# Patient Record
Sex: MALE | Race: WHITE | NOT HISPANIC OR LATINO | Employment: FULL TIME | URBAN - METROPOLITAN AREA
[De-identification: names, ages, dates, MRNs, and addresses within clinical notes are randomized per-mention and may not be internally consistent; named-entity substitution may affect disease eponyms.]

---

## 2020-09-28 ENCOUNTER — HISTORICAL (OUTPATIENT)
Dept: ADMINISTRATIVE | Facility: HOSPITAL | Age: 35
End: 2020-09-28

## 2020-09-28 LAB
ABS NEUT (OLG): 4.6 X10(3)/MCL (ref 2.1–9.2)
ALBUMIN SERPL-MCNC: 4.4 GM/DL (ref 3.4–5)
ALBUMIN/GLOB SERPL: 1.83 {RATIO} (ref 1.5–2.5)
ALP SERPL-CCNC: 65 UNIT/L (ref 38–126)
ALT SERPL-CCNC: 23 UNIT/L (ref 7–52)
APPEARANCE, UA: CLEAR
AST SERPL-CCNC: 17 UNIT/L (ref 15–37)
BACTERIA #/AREA URNS AUTO: NORMAL /HPF
BILIRUB SERPL-MCNC: 0.6 MG/DL (ref 0.2–1)
BILIRUB UR QL STRIP: NEGATIVE MG/DL
BILIRUBIN DIRECT+TOT PNL SERPL-MCNC: 0.2 MG/DL (ref 0–0.5)
BILIRUBIN DIRECT+TOT PNL SERPL-MCNC: 0.4 MG/DL
BUN SERPL-MCNC: 6 MG/DL (ref 7–18)
CALCIUM SERPL-MCNC: 9.2 MG/DL (ref 8.5–10.1)
CHLORIDE SERPL-SCNC: 101 MMOL/L (ref 98–107)
CHOLEST SERPL-MCNC: 177 MG/DL (ref 0–200)
CHOLEST/HDLC SERPL: 3.2 {RATIO}
CO2 SERPL-SCNC: 27 MMOL/L (ref 21–32)
COLOR UR: NORMAL
CREAT SERPL-MCNC: 0.83 MG/DL (ref 0.6–1.3)
ERYTHROCYTE [DISTWIDTH] IN BLOOD BY AUTOMATED COUNT: 13.8 % (ref 11.5–17)
GLOBULIN SER-MCNC: 2.4 GM/DL (ref 1.2–3)
GLUCOSE (UA): NEGATIVE MG/DL
GLUCOSE SERPL-MCNC: 87 MG/DL (ref 74–106)
HCT VFR BLD AUTO: 47.4 % (ref 42–52)
HDLC SERPL-MCNC: 56 MG/DL (ref 35–60)
HGB BLD-MCNC: 15.4 GM/DL (ref 14–18)
HGB UR QL STRIP: NEGATIVE UNIT/L
KETONES UR QL STRIP: NEGATIVE MG/DL
LDLC SERPL CALC-MCNC: 118 MG/DL (ref 0–129)
LEUKOCYTE ESTERASE UR QL STRIP: NEGATIVE UNIT/L
LYMPHOCYTES # BLD AUTO: 2.1 X10(3)/MCL (ref 0.6–3.4)
LYMPHOCYTES NFR BLD AUTO: 27.9 % (ref 13–40)
MCH RBC QN AUTO: 27.8 PG (ref 27–31.2)
MCHC RBC AUTO-ENTMCNC: 32 GM/DL (ref 32–36)
MCV RBC AUTO: 86 FL (ref 80–94)
MONOCYTES # BLD AUTO: 0.7 X10(3)/MCL (ref 0.1–1.3)
MONOCYTES NFR BLD AUTO: 10 % (ref 0.1–24)
NEUTROPHILS NFR BLD AUTO: 62.1 % (ref 47–80)
NITRITE UR QL STRIP.AUTO: NEGATIVE
PH UR STRIP: 8 [PH]
PLATELET # BLD AUTO: 273 X10(3)/MCL (ref 130–400)
PMV BLD AUTO: 9 FL (ref 9.4–12.4)
POTASSIUM SERPL-SCNC: 4.5 MMOL/L (ref 3.5–5.1)
PROT SERPL-MCNC: 6.8 GM/DL (ref 6.4–8.2)
PROT UR QL STRIP: NEGATIVE MG/DL
RBC # BLD AUTO: 5.54 X10(6)/MCL (ref 4.7–6.1)
RBC #/AREA URNS HPF: NORMAL /HPF
SODIUM SERPL-SCNC: 141 MMOL/L (ref 136–145)
SP GR UR STRIP: 1.01
SQUAMOUS EPITHELIAL, UA: NORMAL /LPF
TRIGL SERPL-MCNC: 83 MG/DL (ref 30–150)
TSH SERPL-ACNC: 0.89 MIU/ML (ref 0.35–4.94)
UROBILINOGEN UR STRIP-ACNC: 0.2 MG/DL
VLDLC SERPL CALC-MCNC: 16.6 MG/DL
WBC # SPEC AUTO: 7.4 X10(3)/MCL (ref 4.5–11.5)
WBC #/AREA URNS AUTO: NORMAL /[HPF]

## 2021-09-29 ENCOUNTER — HISTORICAL (OUTPATIENT)
Dept: ADMINISTRATIVE | Facility: HOSPITAL | Age: 36
End: 2021-09-29

## 2021-09-29 LAB
ABS NEUT (OLG): 4.6 X10(3)/MCL (ref 2.1–9.2)
ALBUMIN SERPL-MCNC: 4.6 GM/DL (ref 3.4–5)
ALBUMIN/GLOB SERPL: 2 {RATIO} (ref 1.5–2.5)
ALP SERPL-CCNC: 55 UNIT/L (ref 38–126)
ALT SERPL-CCNC: 33 UNIT/L (ref 7–52)
APPEARANCE, UA: CLEAR
AST SERPL-CCNC: 23 UNIT/L (ref 15–37)
BACTERIA #/AREA URNS AUTO: ABNORMAL /HPF
BILIRUB SERPL-MCNC: 0.8 MG/DL (ref 0.2–1)
BILIRUB UR QL STRIP: NEGATIVE MG/DL
BILIRUBIN DIRECT+TOT PNL SERPL-MCNC: 0.2 MG/DL (ref 0–0.5)
BILIRUBIN DIRECT+TOT PNL SERPL-MCNC: 0.6 MG/DL
BUN SERPL-MCNC: 10 MG/DL (ref 7–18)
CALCIUM SERPL-MCNC: 9.2 MG/DL (ref 8.5–10.1)
CHLORIDE SERPL-SCNC: 102 MMOL/L (ref 98–107)
CHOLEST SERPL-MCNC: 206 MG/DL (ref 0–200)
CHOLEST/HDLC SERPL: 3.6 {RATIO}
CO2 SERPL-SCNC: 26 MMOL/L (ref 21–32)
COLOR UR: YELLOW
CREAT SERPL-MCNC: 0.88 MG/DL (ref 0.6–1.3)
ERYTHROCYTE [DISTWIDTH] IN BLOOD BY AUTOMATED COUNT: 12.8 % (ref 11.5–17)
GLOBULIN SER-MCNC: 2.3 GM/DL (ref 1.2–3)
GLUCOSE (UA): NEGATIVE MG/DL
GLUCOSE SERPL-MCNC: 79 MG/DL (ref 74–106)
HCT VFR BLD AUTO: 46.9 % (ref 42–52)
HDLC SERPL-MCNC: 58 MG/DL (ref 35–60)
HGB BLD-MCNC: 15.3 GM/DL (ref 14–18)
HGB UR QL STRIP: NEGATIVE UNIT/L
KETONES UR QL STRIP: ABNORMAL MG/DL
LDLC SERPL CALC-MCNC: 114 MG/DL (ref 0–129)
LEUKOCYTE ESTERASE UR QL STRIP: NEGATIVE UNIT/L
LYMPHOCYTES # BLD AUTO: 2.8 X10(3)/MCL (ref 0.6–3.4)
LYMPHOCYTES NFR BLD AUTO: 34.4 % (ref 13–40)
MCH RBC QN AUTO: 27.7 PG (ref 27–31.2)
MCHC RBC AUTO-ENTMCNC: 33 GM/DL (ref 32–36)
MCV RBC AUTO: 85 FL (ref 80–94)
MONOCYTES # BLD AUTO: 0.7 X10(3)/MCL (ref 0.1–1.3)
MONOCYTES NFR BLD AUTO: 8.7 % (ref 0.1–24)
NEUTROPHILS NFR BLD AUTO: 56.9 % (ref 47–80)
NITRITE UR QL STRIP.AUTO: NEGATIVE
PH UR STRIP: 6 [PH]
PLATELET # BLD AUTO: 273 X10(3)/MCL (ref 130–400)
PMV BLD AUTO: 8.4 FL (ref 9.4–12.4)
POTASSIUM SERPL-SCNC: 4.3 MMOL/L (ref 3.5–5.1)
PROT SERPL-MCNC: 6.9 GM/DL (ref 6.4–8.2)
PROT UR QL STRIP: NEGATIVE MG/DL
RBC # BLD AUTO: 5.52 X10(6)/MCL (ref 4.7–6.1)
RBC #/AREA URNS HPF: ABNORMAL /HPF
SODIUM SERPL-SCNC: 139 MMOL/L (ref 136–145)
SP GR UR STRIP: 1.01
SQUAMOUS EPITHELIAL, UA: ABNORMAL /LPF
TRIGL SERPL-MCNC: 60 MG/DL (ref 30–150)
TSH SERPL-ACNC: 0.59 MIU/ML (ref 0.35–4.94)
UROBILINOGEN UR STRIP-ACNC: 0.2 MG/DL
VLDLC SERPL CALC-MCNC: 12 MG/DL
WBC # SPEC AUTO: 8.1 X10(3)/MCL (ref 4.5–11.5)
WBC #/AREA URNS AUTO: ABNORMAL /[HPF]

## 2021-09-30 LAB — EST CREAT CLEARANCE SER (OHS): 210.63 ML/MIN

## 2022-04-10 ENCOUNTER — HISTORICAL (OUTPATIENT)
Dept: ADMINISTRATIVE | Facility: HOSPITAL | Age: 37
End: 2022-04-10

## 2022-04-26 VITALS
OXYGEN SATURATION: 96 % | HEIGHT: 71 IN | WEIGHT: 277.56 LBS | DIASTOLIC BLOOD PRESSURE: 78 MMHG | SYSTOLIC BLOOD PRESSURE: 120 MMHG | BODY MASS INDEX: 38.86 KG/M2

## 2022-05-03 NOTE — HISTORICAL [facility]
This is a historical note converted from Olena. Formatting and pictures may have been removed.  Please reference Olena for original formatting and attached multimedia. Chief Complaint  Annual wellness physical- NPO  History of Present Illness  Patient present today for Wellness CPX.  He is feeling well.  He is sleeping well.  He walks about 3x week.? Goes to gym frequently.?  He has alcohol 1-2 times a week.  He has 2 cups of coffee a day.  No tobacco.  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  He is single.? No children.  Taking Descovy for PrEP.  He has a longstanding history of?anal fissures?and internal hemorrhoids.? He has hard?and?infrequent stool.? He may have a bowel movement every 1 to 2 days.? His stool is hard?and brown?with blood?noted in stool x1 month.? He has blood when wiping.? He has rectal discomfort after bowel movements.? No rectal itching, diarrhea.  Review of Systems  GENERAL: No weight loss, no?weight gain, no fever, no fatigue, no chills, no night sweats  HEENT: No sore throat, no ear pain, no sinus pressure, no nasal congestion, no rhinorrhea, no decreased hearing, no snoring  VISION: No vision changes, no blurry vision, no double vision, no glaucoma, no cataracts, +glasses, +contacts  LAST EYE EXAM: June 2021  NECK: no LAD  CARDIAC: No chest pain, no palpitations, no dyspnea on exertion, no orthopnea  RESPIRATORY: No cough, no wheezing, no sputum production, no?SOB  GI: No abdominal pain, no N/V, no constipation, no diarrhea, +blood in stool,?(- )?family history of Colon Ca  : No dysuria, no hematuria, no frequency, no urgency, no incontinence, no testicular pain/swelling, ( -) family history of Prostate Ca  MUSC/SKEL: No myalgia, no weakness, no edema, no arthralgia, no joint swelling/effusions  SKIN: No rashes, no hives, no itching, no sores  NEURO: No HA, no numbness, no tingling, no weakness, no dizziness  PSYCH: No anxiety, no depression, no?irritability, no panic attacks,?no s/i, no  h/i, no?hallucinations  ENDO: No polyuria, no polyphagia,? no polydipsia  HEME: No bruising, no bleeding disorders, no signs of anemia.?  Physical Exam  Vitals & Measurements  T:?36.8? ?C (Oral)? HR:?72(Peripheral)? BP:?120/78? SpO2:?96%?  HT:?180.00?cm? WT:?125.900?kg? BMI:?38.86?  General: Well developed, well nourished in no apparent distress, alert and oriented x3  Skin:?No rash or abnormal?lesions  HEENT:?Normocephalic, PERRLA, EOMI, mouth WNL, throat WNL, nares normal, EAC and TM WNL bilaterally  Neck:?FROM, no LAD, no thyroid abnormalities?palpable  Chest:?CTA?bilaterally, no wheezes crackles or rubs  Cardiac: RRR,?no murmurs, rubs, gallops  Abdomen: Soft, nontender,?nondistended, NBSx4, no rebound tenderness or guarding, no HSM  Extremities:?No clubbing, cyanosis, or edema.? Joints WNL, +2 DP/PT pulses bilaterally  Neuro: No sensory or motor defects noted. CN II-XII intact. Gait WNL.  Genital: normal testes, no hernia  Rectum:?No external?hemorrhoids?noted. ?No fissures?noted.  Assessment/Plan  1.?Wellness examination?Z00.00  CBC, CMP, Lipids, UA, TSH?ordered today.?  He is taking Descovy for PrEP.  Encourage pt to increase cardiovascular exercise and attempt to obtain at least 150 minutes of moderate aerobic exercise per week or 75 minutes of vigorous aerobic exercise weekly.??  Ordered:  CBC w/ Auto Diff, Routine collect, 09/29/21 15:59:00 CDT, Blood, Order for future visit, Stop date 09/29/21 15:59:00 CDT, Lab Collect, Wellness examination  Depression  Anxiety  Internal hemorrhoids, 09/29/21 15:59:00 CDT  Clinic Follow-Up Wellness, *Est. 09/29/22 3:00:00 CDT, Order for future visit, Wellness examination, HLink [facility]  Comprehensive Metabolic Panel, Routine collect, 09/29/21 15:59:00 CDT, Blood, Order for future visit, Stop date 09/29/21 15:59:00 CDT, Lab Collect, Wellness examination  Depression  Anxiety  Internal hemorrhoids, 09/29/21 15:59:00 CDT  Lab Collection Request, 09/29/21 15:59:00 CDJUAN MILLER  AMB - [facility], 09/29/21 15:59:00 CDT, Wellness examination  Depression  Anxiety  Internal hemorrhoids  Lipid Panel, Routine collect, 09/29/21 15:59:00 CDT, Blood, Order for future visit, Stop date 09/29/21 15:59:00 CDT, Lab Collect, Wellness examination, 09/29/21 15:59:00 CDT  Preventative Health Care Est 18-39 years [id number] PC, Wellness examination  Depression  Anxiety  Immunization due  Internal hemorrhoids, HLINK AMB - [facility], 09/29/21 15:59:00 CDT  Thyroid Stimulating Hormone, Routine collect, 09/29/21 15:59:00 CDT, Blood, Order for future visit, Stop date 09/29/21 15:59:00 CDT, Lab Collect, Wellness examination  Depression  Anxiety, 09/29/21 15:59:00 CDT  Urinalysis no Reflex, Routine collect, Urine, Order for future visit, 09/29/21 15:59:00 CDT, Stop date 09/29/21 15:59:00 CDT, Nurse collect, Wellness examination  ?  2.?Depression?F32.9  Stable with Trintellix;?continue.? ED precautions per suicidal ideation,?homicidal ideation,?self-harm?and verbalized understanding.? If concerns or issues arise after office hours,?call 911 or report to emergency room; patient verbalized understanding.?  Ordered:  CBC w/ Auto Diff, Routine collect, 09/29/21 15:59:00 CDT, Blood, Order for future visit, Stop date 09/29/21 15:59:00 CDT, Lab Collect, Wellness examination  Depression  Anxiety  Internal hemorrhoids, 09/29/21 15:59:00 CDT  Comprehensive Metabolic Panel, Routine collect, 09/29/21 15:59:00 CDT, Blood, Order for future visit, Stop date 09/29/21 15:59:00 CDT, Lab Collect, Wellness examination  Depression  Anxiety  Internal hemorrhoids, 09/29/21 15:59:00 CDT  Lab Collection Request, 09/29/21 15:59:00 CDT, HLINK AMB - [facility], 09/29/21 15:59:00 CDT, Wellness examination  Depression  Anxiety  Internal hemorrhoids  Preventative Health Care Est 18-39 years [id number] PC, Wellness examination  Depression  Anxiety  Immunization due  Internal hemorrhoids, HLINK AMB - [facility], 09/29/21 15:59:00 CDT  Thyroid Stimulating Hormone,  Routine collect, 09/29/21 15:59:00 CDT, Blood, Order for future visit, Stop date 09/29/21 15:59:00 CDT, Lab Collect, Wellness examination  Depression  Anxiety, 09/29/21 15:59:00 CDT  ?  3.?Anxiety?F41.9  #2.  Ordered:  CBC w/ Auto Diff, Routine collect, 09/29/21 15:59:00 CDT, Blood, Order for future visit, Stop date 09/29/21 15:59:00 CDT, Lab Collect, Wellness examination  Depression  Anxiety  Internal hemorrhoids, 09/29/21 15:59:00 CDT  Comprehensive Metabolic Panel, Routine collect, 09/29/21 15:59:00 CDT, Blood, Order for future visit, Stop date 09/29/21 15:59:00 CDT, Lab Collect, Wellness examination  Depression  Anxiety  Internal hemorrhoids, 09/29/21 15:59:00 CDT  Lab Collection Request, 09/29/21 15:59:00 CDT, Teravac AMB - [facility], 09/29/21 15:59:00 CDT, Wellness examination  Depression  Anxiety  Internal hemorrhoids  Preventative Health Care Est 18-39 years [id number] PC, Wellness examination  Depression  Anxiety  Immunization due  Internal hemorrhoids, Teravac AMB - [facility], 09/29/21 15:59:00 CDT  Thyroid Stimulating Hormone, Routine collect, 09/29/21 15:59:00 CDT, Blood, Order for future visit, Stop date 09/29/21 15:59:00 CDT, Lab Collect, Wellness examination  Depression  Anxiety, 09/29/21 15:59:00 CDT  ?  4.?Immunization due?Z23  Flu?0.5ml admin today--risks/benefits discussed with patient.?  Tdap?0.5ml admin today--risks/benefits discussed with patient.?  Ordered:  Influenza Virus Vaccine, Inactivated, 0.5 mL, IM, Once-Unscheduled, first dose 09/29/21 15:59:00 CDT  tetanus/diphth/pertuss (Tdap) adult/adol, 0.5 mL, IM, Once-Unscheduled, first dose 09/29/21 15:59:00 CDT  Preventative Health Care Est 18-39 years [id number] PC, Wellness examination  Depression  Anxiety  Immunization due  Internal hemorrhoids, Loop AppINK AMB - [facility], 09/29/21 15:59:00 CDT  ?  5.?Internal hemorrhoids?K64.8  [facility]  Soften stool with MiraLAX or Colace stool softeners.  He is to call if persist  despite above therapies---will send to GI for further eval.  Ordered:  CBC w/ Auto Diff, Routine collect, 09/29/21 15:59:00 CDT, Blood, Order for future visit, Stop date 09/29/21 15:59:00 CDT, Lab Collect, Wellness examination  Depression  Anxiety  Internal hemorrhoids, 09/29/21 15:59:00 CDT  Comprehensive Metabolic Panel, Routine collect, 09/29/21 15:59:00 CDT, Blood, Order for future visit, Stop date 09/29/21 15:59:00 CDT, Lab Collect, Wellness examination  Depression  Anxiety  Internal hemorrhoids, 09/29/21 15:59:00 CDT  Lab Collection Request, 09/29/21 15:59:00 CDT, HLINK AMB - [facility], 09/29/21 15:59:00 CDT, Wellness examination  Depression  Anxiety  Internal hemorrhoids  Preventative Health Care Est 18-39 years [id number] PC, Wellness examination  Depression  Anxiety  Immunization due  Internal hemorrhoids, HLINK AMB - [facility], 09/29/21 15:59:00 CDT  ?  Orders:  vortioxetine, 20 mg = 1 tab(s), Oral, Daily, # 30 tab(s), 11 Refill(s), Pharmacy: [facility] [id number] [facility], 180, cm, Height/Length Dosing, 09/29/21 15:20:00 CDT, 125.9, kg, Weight Dosing, 09/29/21 15:20:00 CDT  Referrals  Clinic Follow-Up Wellness, *Est. 09/29/22 3:00:00 CDT, Order for future visit, Wellness examination, [facility]Link [facility]   Problem List/Past Medical History  Ongoing  Anal fissure  Anxiety  Depression  Internal hemorrhoids  Morbid obesity  Historical  No qualifying data  Procedure/Surgical History  Deviated nasal septum (04.2021)  [place] teeth extracted   Medications  Descovy 200 mg-25 mg oral tablet, 1 tab(s), Oral, Daily  Trintellix 20 mg oral tablet, 20 mg= 1 tab(s), Oral, Daily, 11 refills  Allergies  No Known Medication Allergies  Social History  Abuse/Neglect  No, 06/26/2020  Alcohol  Current, 1-2 times per week, 04/15/2019  Employment/School  Employed, Work/School description: - [employer]., 04/15/2019  Exercise  Home/Environment  Living situation: Home/Independent., 04/15/2019  Nutrition/Health  Regular, Caffeine  intake amount: 2 CUPS OF COFFEE PER DAY., 04/15/2019  Substance Use  Never, 04/15/2019  Tobacco  Never (less than 100 in lifetime), N/A, 06/26/2020  Family History  Diabetes mellitus: Mother.  Immunizations  Vaccine Date Status   tetanus/diphtheria/pertussis, acel(Tdap) 09/29/2021 Given   influenza virus vaccine, inactivated 09/29/2021 Given   COVID-19 MRNA, LNP-S, PF- Pfizer 03/24/2021 Given   COVID-19 MRNA, LNP-S, PF- Pfizer 03/03/2021 Given   influenza virus vaccine, inactivated 09/27/2020 Recorded   influenza virus vaccine, inactivated 11/20/2019 Given   Health Maintenance  Health Maintenance  ???Pending?(in the next year)  ??? ??OverDue  ??? ? ? ?Alcohol Misuse Screening due??01/02/21??and every 1??year(s)  ??? ??Due?  ??? ? ? ?ADL Screening due??09/29/21??and every 1??year(s)  ??? ??Due In Future?  ??? ? ? ?Obesity Screening not due until??01/01/22??and every 1??year(s)  ???Satisfied?(in the past 1 year)  ??? ??Satisfied?  ??? ? ? ?Blood Pressure Screening on??09/29/21.??Satisfied by Eden Rosa LPN  ??? ? ? ?Body Mass Index Check on??09/29/21.??Satisfied by Eden Rosa LPN  ??? ? ? ?Influenza Vaccine on??09/29/21.??Satisfied by Eden Rosa LPN  ??? ? ? ?Obesity Screening on??09/29/21.??Satisfied by Eden Rosa LPN  ??? ? ? ?Tetanus Vaccine on??09/29/21.??Satisfied by Eden Rosa LPN  ?      Patients condition discussed in detail with nurse practitioner.?  I have reviewed and agree with plan of care and follow-up.

## 2022-05-03 NOTE — HISTORICAL [facility]
This is a historical note converted from Olena. Formatting and pictures may have been removed.  Please reference Olena for original formatting and attached multimedia. Chief Complaint  Annual wellness physical- NPO  History of Present Illness  Pt presents for 3 mo recheck.  His anxiety and depression have been stable.  He is sleeping well.  He walks about 3 x week.  He took a flu shot yesterday.  He has alcohol 1-2 times a week.  He has 2 cups of coffee a day.  No tobacco.  Review of Systems  Constitutional:??no?weight gain,??no?weight loss,??no?fatigue, ?no?fever, ?no?chills, ?no?weakness, ?no?trouble sleeping  Eyes: ?no?vision loss/changes,??+ contacts,??no?pain,??no?redness,??no?blurry or double vision,??no?flashing lights,??no?glaucoma,??no?cataracts  Last eye exam:? a few months ago,??  Neck:? no?thyroid abnormalities,??no?bruits,??no?stiffness  Ears:??no?decreased hearing,??no?tinnitus,?occasional pressure sensation left ear,?no?earache,??no?drainage?  Nose:??no?congestion,??no?rhinorrhea,??no?epistaxis,??no?sinus pressure  Throat/Oral:??no?sore throat,??no?hoarseness, ?no?dental caries,??no?gum bleeding,??no?oral lesions  Cardiovascular:??no?chest pain, palpitations, or tightness,??no?dyspnea with exertion,??no?orthopnea,??no?paroxysmal nocturnal dyspnea  Respiratory:??no?cough,??no?sputum,??no?hemoptysis,??no?dyspnea,??no?wheezing,??no?pleuritic chest pain?  Gastrointestinal:??no?abdominal pain,??no?nausea,??no?vomiting,??no?heartburn,??no?dysphagia or odynophagia,??no?diarrhea,??no?constipation,?he has a bowel movement every other day, ?no?melena,??no?hematochezia,?no?jaundice  Urinary:??no?frequency,??no?urgency,??no?burning or pain,?no?hematuria,??no?incontinence,??no?hesitancy,??no?incomplete voiding,??no?flank pain,??no?nocturia  Musculoskeletal:?no?myalgias,??no?arthralgias,?no?neck pain,??no?back pain,??no?swelling of extremities  Skin:?no?rashes,??no?sores,??no?non-healing  wounds  Neurologic:??no?headaches,??no?dizziness/lightheadedness,??no?tremors,??no?paresthesias,??no?seizures,??no?muscle weakness  Psychiatric:??no?depression/sadness,??no?anhedonia,??no?irritability,??no?suicidal ideations,??no?anxiety,??no?panic attacks  Endocrine:??no?hot or cold intolerance,??+?sweating,??no?polyuria,??no?polydipsia,??no?polyphagia  Hematologic:??no?bruising,??no?bleeding disorders?  Physical Exam  Vitals & Measurements  T:?37.3? ?C (Oral)? HR:?76(Peripheral)? BP:?130/70? SpO2:?97%?  HT:?180.00?cm? WT:?135.000?kg? BMI:?41.67?  ?  GENERAL: The patient is a well-developed, well-nourished obese white male in no?apparent distress. He is alert and oriented x 4. ?He has a good affect.?He answers questions appropriately.  HEENT: Head is normocephalic and atraumatic. Extraocular muscles are intact. Pupils are equal, round, and reactive to light and accommodation. Nares appeared normal. Mouth is well hydrated and without lesions. Mucous membranes are moist. Posterior pharynx clear of any exudate or lesions.  NECK: Supple. No carotid bruits.? No lymphadenopathy or thyromegaly.  LUNGS: Clear to auscultation.  HEART: Regular rate and rhythm without murmur, gallops or rubs.  ABDOMEN: Soft, nontender,?obese and nondistended.? Positive bowel sounds.? No hepatosplenomegaly was noted.  EXTREMITIES: Without any cyanosis, clubbing, rash, lesions or edema.  NEUROLOGIC: Cranial nerves II through XII are grossly intact.? No motor or sensory deficits.? Cerebellar function intact.  SKIN: No ulceration or induration present.  :? Normal male genitalia,?uncircumcised, no hernias,?testes descended with normal size and consistency.  ?  Assessment/Plan  1.?Wellness examination?Z00.00  Patient?presents for wellness examination.?  His anxiety and depression are well controlled.  Patient encouraged to be physically active, increase exercise and lose weight.  He got a flu shot yesterday.  Labs pending.  Ordered:  CBC w/ Auto  Diff, Routine collect, 09/28/20 10:28:00 CDT, Blood, Order for future visit, Stop date 09/28/20 10:28:00 CDT, Lab Collect, Wellness examination, 09/28/20 10:28:00 CDT  Clinic Follow-Up Wellness, *Est. 09/28/21 3:00:00 CDT, Order for future visit, Wellness examination, HLink AFP  Comprehensive Metabolic Panel, Routine collect, 09/28/20 10:28:00 CDT, Blood, Order for future visit, Stop date 09/28/20 10:28:00 CDT, Lab Collect, Wellness examination, 09/28/20 10:28:00 CDT  Lab Collection Request, 09/28/20 10:28:00 CDT, HLINK AMB - AFP, 09/28/20 10:28:00 CDT, Wellness examination  Depression  Lipid Panel, Routine collect, 09/28/20 10:28:00 CDT, Blood, Order for future visit, Stop date 09/28/20 10:28:00 CDT, Lab Collect, Wellness examination, 09/28/20 10:28:00 CDT  Preventative Health Care Est 18-39 years [address]127 PC, Wellness examination  Anxiety  Depression  Morbid obesity, HLINK AMB - AFP, 09/28/20 10:28:00 CDT  Urinalysis no Reflex, Routine collect, Urine, Order for future visit, 09/28/20 10:28:00 CDT, Stop date 09/28/20 10:28:00 CDT, Nurse collect, Wellness examination  ?  2.?Anxiety?F41.9  ?Stable; continue current medication.  Ordered:  Clinic Follow up, *Est. 03/28/21 3:00:00 CDT, Order for future visit, Anxiety  Depression, HLink AFP  Preventative Health Care Est 18-39 years [id number] PC, Wellness examination  Anxiety  Depression  Morbid obesity, HLINK AMB - AFP, 09/28/20 10:28:00 CDT  ?  3.?Depression?F32.9  See #2.  Ordered:  Clinic Follow up, *Est. 03/28/21 3:00:00 CDT, Order for future visit, Anxiety  Depression, HLink AFP  Lab Collection Request, 09/28/20 10:28:00 CDT, HLINK AMB - AFP, 09/28/20 10:28:00 CDT, Wellness examination  Depression  Preventative Health Care Est 18-39 years 43504 PC, Wellness examination  Anxiety  Depression  Morbid obesity, HLINK AMB - AFP, 09/28/20 10:28:00 CDT  Thyroid Stimulating Hormone, Routine collect, 09/28/20 10:28:00 CDT, Blood, Order for future visit, Stop date  09/28/20 10:28:00 CDT, Lab Collect, Depression, 09/28/20 10:28:00 CDT  ?  4.?Morbid obesity?E66.01  ?Patient encouraged to be more physically active, exercise and lose weight.  Ordered:  Preventative Health Care Est 18-39 years [id number] PC, Wellness examination  Anxiety  Depression  Morbid obesity, HLINK AMB - AFP, 09/28/20 10:28:00 CDT  ?  Orders:  vortioxetine, 10 mg = 1 tab(s), Oral, Daily, # 30 tab(s), 5 Refill(s), Pharmacy: [facility] [id number] [facility], 180, cm, Height/Length Dosing, 09/28/20 10:06:00 CDT, 135, kg, Weight Dosing, 09/28/20 10:06:00 CDT  Referrals  Clinic Follow up, *Est. 03/28/21 3:00:00 CDT, Order for future visit, Anxiety  Depression, HLink AFP  Clinic Follow-Up Wellness, *Est. 09/28/21 3:00:00 CDT, Order for future visit, Wellness examination, HLink AFP   Problem List/Past Medical History  Ongoing  Anal fissure  Anxiety  Depression  Internal hemorrhoids  Morbid obesity  Historical  No qualifying data  Procedure/Surgical History  [place] teeth extracted   Medications  Trintellix 10 mg oral tablet, 10 mg= 1 tab(s), Oral, Daily, 5 refills  Allergies  No Known Medication Allergies  Social History  Abuse/Neglect  No, 06/26/2020  Alcohol  Current, 1-2 times per week, 04/15/2019  Employment/School  Employed, Work/School description: - [employer]., 04/15/2019  Exercise  Home/Environment  Living situation: Home/Independent., 04/15/2019  Nutrition/Health  Regular, Caffeine intake amount: 2 CUPS OF COFFEE PER DAY., 04/15/2019  Substance Use  Never, 04/15/2019  Tobacco  Never (less than 100 in lifetime), N/A, 06/26/2020  Family History  Diabetes mellitus: Mother.  Immunizations  Vaccine Date Status   influenza virus vaccine, inactivated 11/20/2019 Given   Health Maintenance  Health Maintenance  ???Pending?(in the next year)  ??? ??OverDue  ??? ? ? ?Influenza Vaccine due??and every?  ??? ? ? ?Alcohol Misuse Screening due??01/02/20??and every 1??year(s)  ??? ??Due?  ??? ? ? ?ADL  Screening due??09/28/20??and every 1??year(s)  ??? ? ? ?Diabetes Screening due??09/28/20??and every?  ??? ? ? ?Tetanus Vaccine due??09/28/20??and every 10??year(s)  ??? ??Due In Future?  ??? ? ? ?Obesity Screening not due until??01/01/21??and every 1??year(s)  ???Satisfied?(in the past 1 year)  ??? ??Satisfied?  ??? ? ? ?Blood Pressure Screening on??09/28/20.??Satisfied by Eden Rosa LPN  ??? ? ? ?Body Mass Index Check on??09/28/20.??Satisfied by Eden Rosa LPN  ??? ? ? ?Influenza Vaccine on??11/20/19.??Satisfied by Eden Rosa LPN  ??? ? ? ?Obesity Screening on??09/28/20.??Satisfied by Eden Rosa LPN  ?

## 2022-07-06 PROBLEM — F41.1 GENERALIZED ANXIETY DISORDER: Status: ACTIVE | Noted: 2022-07-06

## 2022-07-06 PROBLEM — F32.A DEPRESSIVE DISORDER: Status: ACTIVE | Noted: 2022-07-06

## 2022-08-19 ENCOUNTER — PATIENT MESSAGE (OUTPATIENT)
Dept: ADMINISTRATIVE | Facility: HOSPITAL | Age: 37
End: 2022-08-19

## 2022-08-24 ENCOUNTER — PATIENT MESSAGE (OUTPATIENT)
Dept: ADMINISTRATIVE | Facility: HOSPITAL | Age: 37
End: 2022-08-24

## 2022-08-24 RX ORDER — HYDROCORTISONE ACETATE PRAMOXINE HCL 2.5; 1 G/100G; G/100G
CREAM TOPICAL 3 TIMES DAILY
Qty: 30 G | Refills: 1 | Status: SHIPPED | OUTPATIENT
Start: 2022-08-24 | End: 2022-09-30

## 2022-08-25 RX ORDER — HYDROCORTISONE 25 MG/G
CREAM TOPICAL 2 TIMES DAILY
Qty: 28 G | Refills: 1 | Status: SHIPPED | OUTPATIENT
Start: 2022-08-25 | End: 2023-01-23

## 2022-09-29 PROBLEM — Z00.00 ENCOUNTER FOR WELLNESS EXAMINATION IN ADULT: Status: ACTIVE | Noted: 2022-09-29

## 2022-09-30 PROBLEM — Z23 IMMUNIZATION DUE: Status: ACTIVE | Noted: 2022-09-30

## 2023-01-02 PROBLEM — Z00.00 ENCOUNTER FOR WELLNESS EXAMINATION IN ADULT: Status: RESOLVED | Noted: 2022-09-29 | Resolved: 2023-01-02

## 2023-01-22 ENCOUNTER — PATIENT MESSAGE (OUTPATIENT)
Dept: ADMINISTRATIVE | Facility: HOSPITAL | Age: 38
End: 2023-01-22

## 2023-07-07 PROBLEM — N52.9 ERECTILE DYSFUNCTION: Status: ACTIVE | Noted: 2023-07-07

## 2023-10-02 PROBLEM — L65.9 BALDING: Status: ACTIVE | Noted: 2023-10-02

## 2023-10-02 PROBLEM — E66.812 CLASS 2 OBESITY DUE TO EXCESS CALORIES WITHOUT SERIOUS COMORBIDITY WITH BODY MASS INDEX (BMI) OF 35.0 TO 35.9 IN ADULT: Status: ACTIVE | Noted: 2023-10-02

## 2023-10-02 PROBLEM — Z79.899 ON PRE-EXPOSURE PROPHYLAXIS FOR HIV: Status: ACTIVE | Noted: 2023-10-02

## 2023-10-02 PROBLEM — E66.09 CLASS 2 OBESITY DUE TO EXCESS CALORIES WITHOUT SERIOUS COMORBIDITY WITH BODY MASS INDEX (BMI) OF 35.0 TO 35.9 IN ADULT: Status: ACTIVE | Noted: 2023-10-02

## 2023-10-02 PROBLEM — R53.83 FATIGUE: Status: ACTIVE | Noted: 2023-10-02

## 2023-10-02 PROBLEM — M25.561 ACUTE PAIN OF RIGHT KNEE: Status: ACTIVE | Noted: 2023-10-02

## 2023-11-10 ENCOUNTER — PATIENT MESSAGE (OUTPATIENT)
Dept: ADMINISTRATIVE | Facility: OTHER | Age: 38
End: 2023-11-10

## 2024-01-01 PROBLEM — Z00.00 ENCOUNTER FOR WELLNESS EXAMINATION IN ADULT: Status: RESOLVED | Noted: 2022-09-29 | Resolved: 2024-01-01

## 2024-03-13 PROBLEM — M25.561 ACUTE PAIN OF RIGHT KNEE: Status: RESOLVED | Noted: 2023-10-02 | Resolved: 2024-03-13

## 2024-03-13 PROBLEM — L64.9 MALE PATTERN BALDNESS: Status: ACTIVE | Noted: 2023-10-02

## 2024-04-06 DIAGNOSIS — L08.9 SKIN LESION, INFECTED: ICD-10-CM

## 2024-04-08 RX ORDER — MUPIROCIN 20 MG/G
OINTMENT TOPICAL
Qty: 22 G | Refills: 1 | Status: SHIPPED | OUTPATIENT
Start: 2024-04-08

## 2024-04-22 PROBLEM — F33.1 MODERATE EPISODE OF RECURRENT MAJOR DEPRESSIVE DISORDER: Status: ACTIVE | Noted: 2022-07-06

## 2024-04-22 PROBLEM — L03.111 CELLULITIS OF RIGHT AXILLA: Status: ACTIVE | Noted: 2024-04-22

## 2024-10-03 PROBLEM — N52.9 ERECTILE DYSFUNCTION: Status: RESOLVED | Noted: 2023-07-07 | Resolved: 2024-10-03

## 2024-10-03 PROBLEM — E78.5 MILD HYPERLIPIDEMIA: Status: ACTIVE | Noted: 2024-10-03

## 2024-10-03 PROBLEM — H61.22 LEFT EAR IMPACTED CERUMEN: Status: ACTIVE | Noted: 2024-10-03

## 2024-10-03 PROBLEM — L03.111 CELLULITIS OF RIGHT AXILLA: Status: RESOLVED | Noted: 2024-04-22 | Resolved: 2024-10-03

## 2025-04-16 ENCOUNTER — OFFICE VISIT (OUTPATIENT)
Dept: URGENT CARE | Facility: CLINIC | Age: 40
End: 2025-04-16
Payer: COMMERCIAL

## 2025-04-16 VITALS
OXYGEN SATURATION: 96 % | TEMPERATURE: 100 F | HEIGHT: 71 IN | BODY MASS INDEX: 38.5 KG/M2 | RESPIRATION RATE: 18 BRPM | HEART RATE: 121 BPM | SYSTOLIC BLOOD PRESSURE: 139 MMHG | WEIGHT: 275 LBS | DIASTOLIC BLOOD PRESSURE: 93 MMHG

## 2025-04-16 DIAGNOSIS — R50.9 FEVER, UNSPECIFIED FEVER CAUSE: ICD-10-CM

## 2025-04-16 DIAGNOSIS — J02.9 VIRAL PHARYNGITIS: Primary | ICD-10-CM

## 2025-04-16 DIAGNOSIS — J02.9 SORE THROAT: ICD-10-CM

## 2025-04-16 LAB
CTP QC/QA: YES
MOLECULAR STREP A: NEGATIVE

## 2025-04-16 RX ORDER — ACETAMINOPHEN 500 MG
1000 TABLET ORAL
Status: COMPLETED | OUTPATIENT
Start: 2025-04-16 | End: 2025-04-16

## 2025-04-16 RX ORDER — BETAMETHASONE SODIUM PHOSPHATE AND BETAMETHASONE ACETATE 3; 3 MG/ML; MG/ML
9 INJECTION, SUSPENSION INTRA-ARTICULAR; INTRALESIONAL; INTRAMUSCULAR; SOFT TISSUE
Status: COMPLETED | OUTPATIENT
Start: 2025-04-16 | End: 2025-04-16

## 2025-04-16 RX ADMIN — Medication 1000 MG: at 01:04

## 2025-04-16 RX ADMIN — BETAMETHASONE SODIUM PHOSPHATE AND BETAMETHASONE ACETATE 9 MG: 3; 3 INJECTION, SUSPENSION INTRA-ARTICULAR; INTRALESIONAL; INTRAMUSCULAR; SOFT TISSUE at 01:04

## 2025-04-16 NOTE — PROGRESS NOTES
"Subjective:      Patient ID: Juan F Tan is a 39 y.o. male.    Vitals:  height is 5' 11" (1.803 m) and weight is 124.7 kg (275 lb). His temperature is 100.2 °F (37.9 °C). His blood pressure is 139/93 (abnormal) and his pulse is 121 (abnormal). His respiration is 18 and oxygen saturation is 96%.     Chief Complaint: Sore Throat     Patient is a 39 y.o. male who presents to urgent care with complaints of painful cough, sore throat x yesterday. Alleviating factors include cold and flu meds with mild amount of relief. Patient denies chest pain, shortness of breath, n/v/d.          Constitution: Negative for fatigue and fever.   HENT:  Positive for postnasal drip, sinus pressure and sore throat. Negative for congestion and sinus pain.    Cardiovascular: Negative.    Eyes: Negative.    Gastrointestinal:  Negative for nausea, vomiting and diarrhea.   Endocrine: negative.   Genitourinary:  Negative for dysuria, frequency, urgency and urine decreased.   Musculoskeletal:  Negative for muscle ache.   Neurological: Negative.  Negative for headaches.      Objective:     Physical Exam   Constitutional: He is oriented to person, place, and time. He appears well-developed. He is cooperative.  Non-toxic appearance. He does not appear ill. No distress.   HENT:   Head: Normocephalic and atraumatic.   Ears:   Right Ear: Hearing, tympanic membrane, external ear and ear canal normal.   Left Ear: Hearing, tympanic membrane, external ear and ear canal normal.   Nose: Nose normal. No mucosal edema, rhinorrhea or nasal deformity. No epistaxis. Right sinus exhibits no maxillary sinus tenderness and no frontal sinus tenderness. Left sinus exhibits no maxillary sinus tenderness and no frontal sinus tenderness.   Mouth/Throat: Uvula is midline, oropharynx is clear and moist and mucous membranes are normal. No trismus in the jaw. Normal dentition. No uvula swelling. No oropharyngeal exudate, posterior oropharyngeal edema or posterior " "oropharyngeal erythema.   Eyes: Conjunctivae and lids are normal. No scleral icterus.   Neck: Trachea normal and phonation normal. Neck supple. No edema present. No erythema present. No neck rigidity present.   Cardiovascular: Normal rate, regular rhythm, normal heart sounds and normal pulses.   Pulmonary/Chest: Effort normal and breath sounds normal. No respiratory distress. He has no decreased breath sounds. He has no rhonchi.   Abdominal: Normal appearance.   Musculoskeletal: Normal range of motion.         General: No deformity. Normal range of motion.   Lymphadenopathy:     He has cervical adenopathy.   Neurological: He is alert and oriented to person, place, and time. He exhibits normal muscle tone. Coordination normal.   Skin: Skin is warm, dry, intact, not diaphoretic and not pale.   Psychiatric: His speech is normal and behavior is normal. Judgment and thought content normal.   Nursing note and vitals reviewed.         Previous History      Review of patient's allergies indicates:  No Known Allergies    Past Medical History:   Diagnosis Date    Anal fissure, unspecified     Generalized anxiety disorder     Internal hemorrhoids     Moderate episode of recurrent major depressive disorder      Current Outpatient Medications   Medication Instructions    busPIRone (BUSPAR) 10 mg, Oral, 2 times daily    emtricitabine-tenofovir alafen (DESCOVY) 200-25 mg Tab Daily    multivitamin (THERAGRAN) tablet 1 tablet, Daily    mupirocin (BACTROBAN) 2 % ointment APPLY TOPICALLY 2 TIMES DAILY TO AFFECTED AREA.    TRINTELLIX 20 mg, Daily     Past Surgical History:   Procedure Laterality Date    Drainage of Right Upper Leg Skin, External Approach   02/28/2022    NASAL SEPTOPLASTY  04/2021    Frannie teeth extracted           Social History[1]     Physical Exam      Vital Signs Reviewed   BP (!) 139/93   Pulse (!) 121   Temp 100.2 °F (37.9 °C)   Resp 18   Ht 5' 11" (1.803 m)   Wt 124.7 kg (275 lb)   SpO2 96%   BMI 38.35 " kg/m²        Procedures    Procedures     Labs     Results for orders placed or performed in visit on 04/16/25   POCT Strep A, Molecular    Collection Time: 04/16/25  1:23 PM   Result Value Ref Range    Molecular Strep A, POC Negative Negative     Acceptable Yes       Assessment:     1. Sore throat    2. Fever, unspecified fever cause    3. Viral pharyngitis        Plan:       Sore throat  -     POCT Strep A, Molecular    Fever, unspecified fever cause  -     acetaminophen tablet 1,000 mg    Viral pharyngitis    Other orders  -     betamethasone acetate-betamethasone sodium phosphate injection 9 mg                         [1]   Social History  Tobacco Use    Smoking status: Never    Smokeless tobacco: Never   Substance Use Topics    Alcohol use: Yes     Alcohol/week: 4.0 standard drinks of alcohol     Types: 4 Glasses of wine per week     Comment: 3-4 times per week    Drug use: Never

## 2025-07-17 PROCEDURE — 86695 HERPES SIMPLEX TYPE 1 TEST: CPT | Performed by: STUDENT IN AN ORGANIZED HEALTH CARE EDUCATION/TRAINING PROGRAM

## 2025-07-17 PROCEDURE — 86803 HEPATITIS C AB TEST: CPT | Performed by: STUDENT IN AN ORGANIZED HEALTH CARE EDUCATION/TRAINING PROGRAM

## 2025-07-17 PROCEDURE — 86780 TREPONEMA PALLIDUM: CPT | Performed by: STUDENT IN AN ORGANIZED HEALTH CARE EDUCATION/TRAINING PROGRAM

## 2025-07-17 PROCEDURE — 86706 HEP B SURFACE ANTIBODY: CPT | Performed by: STUDENT IN AN ORGANIZED HEALTH CARE EDUCATION/TRAINING PROGRAM

## 2025-07-17 PROCEDURE — 87661 TRICHOMONAS VAGINALIS AMPLIF: CPT | Performed by: STUDENT IN AN ORGANIZED HEALTH CARE EDUCATION/TRAINING PROGRAM

## 2025-07-17 PROCEDURE — 87340 HEPATITIS B SURFACE AG IA: CPT | Performed by: STUDENT IN AN ORGANIZED HEALTH CARE EDUCATION/TRAINING PROGRAM

## 2025-07-17 PROCEDURE — 86704 HEP B CORE ANTIBODY TOTAL: CPT | Performed by: STUDENT IN AN ORGANIZED HEALTH CARE EDUCATION/TRAINING PROGRAM
